# Patient Record
Sex: FEMALE | Race: WHITE
[De-identification: names, ages, dates, MRNs, and addresses within clinical notes are randomized per-mention and may not be internally consistent; named-entity substitution may affect disease eponyms.]

---

## 2020-11-30 ENCOUNTER — HOSPITAL ENCOUNTER (OUTPATIENT)
Dept: HOSPITAL 95 - LAB | Age: 75
Discharge: HOME | End: 2020-11-30
Attending: PHYSICIAN ASSISTANT
Payer: COMMERCIAL

## 2020-11-30 DIAGNOSIS — R50.9: Primary | ICD-10-CM

## 2020-11-30 DIAGNOSIS — Z20.828: ICD-10-CM

## 2020-11-30 PROCEDURE — U0003 INFECTIOUS AGENT DETECTION BY NUCLEIC ACID (DNA OR RNA); SEVERE ACUTE RESPIRATORY SYNDROME CORONAVIRUS 2 (SARS-COV-2) (CORONAVIRUS DISEASE [COVID-19]), AMPLIFIED PROBE TECHNIQUE, MAKING USE OF HIGH THROUGHPUT TECHNOLOGIES AS DESCRIBED BY CMS-2020-01-R: HCPCS

## 2020-12-04 ENCOUNTER — HOSPITAL ENCOUNTER (EMERGENCY)
Dept: HOSPITAL 95 - ER | Age: 75
Discharge: HOME | End: 2020-12-04
Payer: COMMERCIAL

## 2020-12-04 VITALS — BODY MASS INDEX: 47.09 KG/M2 | HEIGHT: 66 IN | WEIGHT: 293 LBS

## 2020-12-04 DIAGNOSIS — R06.02: ICD-10-CM

## 2020-12-04 DIAGNOSIS — R05: ICD-10-CM

## 2020-12-04 DIAGNOSIS — Z88.6: ICD-10-CM

## 2020-12-04 DIAGNOSIS — R43.9: ICD-10-CM

## 2020-12-04 DIAGNOSIS — R50.9: ICD-10-CM

## 2020-12-04 DIAGNOSIS — R53.1: ICD-10-CM

## 2020-12-04 DIAGNOSIS — U07.1: Primary | ICD-10-CM

## 2020-12-04 DIAGNOSIS — Z88.0: ICD-10-CM

## 2020-12-04 DIAGNOSIS — Z79.899: ICD-10-CM

## 2020-12-04 DIAGNOSIS — Z88.5: ICD-10-CM

## 2020-12-04 LAB
ALBUMIN SERPL BCP-MCNC: 2.9 G/DL (ref 3.4–5)
ALBUMIN/GLOB SERPL: 0.7 {RATIO} (ref 0.8–1.8)
ALT SERPL W P-5'-P-CCNC: 19 U/L (ref 12–78)
ANION GAP SERPL CALCULATED.4IONS-SCNC: 3 MMOL/L (ref 6–16)
AST SERPL W P-5'-P-CCNC: 31 U/L (ref 12–37)
BASOPHILS # BLD AUTO: 0.02 K/MM3 (ref 0–0.23)
BASOPHILS NFR BLD AUTO: 0 % (ref 0–2)
BILIRUB SERPL-MCNC: 1.1 MG/DL (ref 0.1–1)
BUN SERPL-MCNC: 11 MG/DL (ref 8–24)
CALCIUM SERPL-MCNC: 8.6 MG/DL (ref 8.5–10.1)
CHLORIDE SERPL-SCNC: 105 MMOL/L (ref 98–108)
CO2 SERPL-SCNC: 30 MMOL/L (ref 21–32)
CREAT SERPL-MCNC: 0.61 MG/DL (ref 0.4–1)
DEPRECATED RDW RBC AUTO: 41.9 FL (ref 35.1–46.3)
EOSINOPHIL # BLD AUTO: 0.02 K/MM3 (ref 0–0.68)
EOSINOPHIL NFR BLD AUTO: 0 % (ref 0–6)
ERYTHROCYTE [DISTWIDTH] IN BLOOD BY AUTOMATED COUNT: 12 % (ref 11.7–14.2)
GLOBULIN SER CALC-MCNC: 4.4 G/DL (ref 2.2–4)
GLUCOSE SERPL-MCNC: 113 MG/DL (ref 70–99)
HCT VFR BLD AUTO: 44 % (ref 33–51)
HGB BLD-MCNC: 14.1 G/DL (ref 11.5–16)
IMM GRANULOCYTES # BLD AUTO: 0.02 K/MM3 (ref 0–0.1)
IMM GRANULOCYTES NFR BLD AUTO: 0 % (ref 0–1)
LYMPHOCYTES # BLD AUTO: 1.2 K/MM3 (ref 0.84–5.2)
LYMPHOCYTES NFR BLD AUTO: 21 % (ref 21–46)
MCHC RBC AUTO-ENTMCNC: 32 G/DL (ref 31.5–36.5)
MCV RBC AUTO: 94 FL (ref 80–100)
MONOCYTES # BLD AUTO: 0.59 K/MM3 (ref 0.16–1.47)
MONOCYTES NFR BLD AUTO: 10 % (ref 4–13)
NEUTROPHILS # BLD AUTO: 3.98 K/MM3 (ref 1.96–9.15)
NEUTROPHILS NFR BLD AUTO: 68 % (ref 41–73)
NRBC # BLD AUTO: 0 K/MM3 (ref 0–0.02)
NRBC BLD AUTO-RTO: 0 /100 WBC (ref 0–0.2)
PLATELET # BLD AUTO: 177 K/MM3 (ref 150–400)
POTASSIUM SERPL-SCNC: 3.8 MMOL/L (ref 3.5–5.5)
PROT SERPL-MCNC: 7.3 G/DL (ref 6.4–8.2)
SODIUM SERPL-SCNC: 138 MMOL/L (ref 136–145)

## 2021-05-28 ENCOUNTER — HOSPITAL ENCOUNTER (OUTPATIENT)
Dept: HOSPITAL 95 - ORSCSDS | Age: 76
Discharge: HOME | End: 2021-05-28
Attending: SURGERY
Payer: COMMERCIAL

## 2021-05-28 VITALS — HEIGHT: 66 IN | BODY MASS INDEX: 47.09 KG/M2 | WEIGHT: 293 LBS

## 2021-05-28 DIAGNOSIS — Z79.82: ICD-10-CM

## 2021-05-28 DIAGNOSIS — F32.9: ICD-10-CM

## 2021-05-28 DIAGNOSIS — Z79.899: ICD-10-CM

## 2021-05-28 DIAGNOSIS — M79.7: ICD-10-CM

## 2021-05-28 DIAGNOSIS — K44.9: ICD-10-CM

## 2021-05-28 DIAGNOSIS — F41.9: ICD-10-CM

## 2021-05-28 DIAGNOSIS — E66.01: ICD-10-CM

## 2021-05-28 DIAGNOSIS — K21.9: Primary | ICD-10-CM

## 2021-05-28 DIAGNOSIS — K31.7: ICD-10-CM

## 2021-05-28 DIAGNOSIS — I25.10: ICD-10-CM

## 2021-05-28 DIAGNOSIS — G25.81: ICD-10-CM

## 2021-05-28 DIAGNOSIS — I10: ICD-10-CM

## 2021-05-28 DIAGNOSIS — E78.5: ICD-10-CM

## 2023-08-02 ENCOUNTER — HOSPITAL ENCOUNTER (OUTPATIENT)
Dept: HOSPITAL 95 - ORSCSDS | Age: 78
Discharge: HOME | End: 2023-08-02
Attending: SPECIALIST
Payer: COMMERCIAL

## 2023-08-02 VITALS — DIASTOLIC BLOOD PRESSURE: 81 MMHG | SYSTOLIC BLOOD PRESSURE: 125 MMHG

## 2023-08-02 VITALS — BODY MASS INDEX: 44.5 KG/M2 | WEIGHT: 276.9 LBS | HEIGHT: 66 IN

## 2023-08-02 DIAGNOSIS — K59.09: Primary | ICD-10-CM

## 2023-08-02 DIAGNOSIS — I25.10: ICD-10-CM

## 2023-08-02 DIAGNOSIS — K57.30: ICD-10-CM

## 2023-08-02 DIAGNOSIS — K21.9: ICD-10-CM

## 2023-08-02 DIAGNOSIS — F32.A: ICD-10-CM

## 2023-08-02 DIAGNOSIS — Z79.899: ICD-10-CM

## 2023-08-02 DIAGNOSIS — K64.8: ICD-10-CM

## 2023-08-02 DIAGNOSIS — K63.5: ICD-10-CM

## 2023-08-02 DIAGNOSIS — Z79.82: ICD-10-CM

## 2023-08-02 DIAGNOSIS — D12.0: ICD-10-CM

## 2023-08-02 DIAGNOSIS — E78.00: ICD-10-CM

## 2023-08-02 DIAGNOSIS — Z86.010: ICD-10-CM

## 2023-08-02 DIAGNOSIS — G62.9: ICD-10-CM

## 2023-08-02 DIAGNOSIS — E66.01: ICD-10-CM

## 2023-08-02 PROCEDURE — 0DBH8ZX EXCISION OF CECUM, VIA NATURAL OR ARTIFICIAL OPENING ENDOSCOPIC, DIAGNOSTIC: ICD-10-PCS | Performed by: SPECIALIST

## 2023-08-02 PROCEDURE — 0DBK8ZX EXCISION OF ASCENDING COLON, VIA NATURAL OR ARTIFICIAL OPENING ENDOSCOPIC, DIAGNOSTIC: ICD-10-PCS | Performed by: SPECIALIST

## 2023-08-02 NOTE — NUR
08/02/23 1253 Emily Balderas
AT THE SCALE PT STATED SHE HAD A DRINK OF WATER AT 1200, ARRIVAL AT
1230; AFTER SPEAKING WITH YARITZA PIERCE PT RECANTED STATEMENT ABOUT DRINING
WATER